# Patient Record
(demographics unavailable — no encounter records)

---

## 2025-04-23 NOTE — PHYSICAL EXAM
[No Edema] : there was no peripheral edema [Normal] : affect was normal and insight and judgment were intact [de-identified] : soft flesh colored lump on left buttock

## 2025-04-23 NOTE — ASSESSMENT
[FreeTextEntry1] : Prediabetes-continue lifestyle and dietary modifications at this time.  Will repeat hgba1c today  HLD-patient to work on dietary modifications as above, will repeat lipid panel today  Fatigue pt attempts to persistent fatigue recommend that patient try and increase sleep to 8 hours per night will check cbc with diff, cmp, TFTs, vitamin b12 recommend increase oral hydration, decreasing caffeine intake, increasing physical activity and work on improving diet to help combat fatigue  Skin lesion - will refer to derm for further evaluation

## 2025-04-23 NOTE — HISTORY OF PRESENT ILLNESS
[FreeTextEntry1] : follow up of chronic medical conditions [de-identified] : 41 y/o male with pmhx of hld and prediabetes presents for follow up. Patient has been having alot of fatigue recently.  Patient states that he has not been eating the best diet recently.  He only sleeps about 6 hours per night and works 12-hour days.  He also has noticed a small lump on his buttock for the past couple years that he wants to have evaluated. It is not painful.

## 2025-05-28 NOTE — HISTORY OF PRESENT ILLNESS
[de-identified] : 42 year male comes in today with tinnitus in right ear.  States it has been present for two years.  Seen by another ENT, and had hearing tested before.  Denies vertigo, hearing loss, otalgia, ottorhea, or fevers chills.

## 2025-05-28 NOTE — ASSESSMENT
[FreeTextEntry1] : Right tinnitus.  Check Audiogram. Noise protection. Using noise machine at night already. Seek attention for otalgia, otorrhea, bleeding, headache, hearing loss, dizziness or vertigo.